# Patient Record
Sex: FEMALE | Race: WHITE
[De-identification: names, ages, dates, MRNs, and addresses within clinical notes are randomized per-mention and may not be internally consistent; named-entity substitution may affect disease eponyms.]

---

## 2018-11-05 ENCOUNTER — HOSPITAL ENCOUNTER (EMERGENCY)
Dept: HOSPITAL 25 - ED | Age: 28
Discharge: HOME | End: 2018-11-05
Payer: SELF-PAY

## 2018-11-05 VITALS — DIASTOLIC BLOOD PRESSURE: 78 MMHG | SYSTOLIC BLOOD PRESSURE: 128 MMHG

## 2018-11-05 DIAGNOSIS — R07.89: Primary | ICD-10-CM

## 2018-11-05 DIAGNOSIS — F41.9: ICD-10-CM

## 2018-11-05 LAB
BASOPHILS # BLD AUTO: 0 10^3/UL (ref 0–0.2)
EOSINOPHIL # BLD AUTO: 0.1 10^3/UL (ref 0–0.6)
HCT VFR BLD AUTO: 42 % (ref 35–47)
HGB BLD-MCNC: 14.3 G/DL (ref 12–16)
LYMPHOCYTES # BLD AUTO: 2.5 10^3/UL (ref 1–4.8)
MCH RBC QN AUTO: 29 PG (ref 27–31)
MCHC RBC AUTO-ENTMCNC: 34 G/DL (ref 31–36)
MCV RBC AUTO: 85 FL (ref 80–97)
MONOCYTES # BLD AUTO: 0.8 10^3/UL (ref 0–0.8)
NEUTROPHILS # BLD AUTO: 3.1 10^3/UL (ref 1.5–7.7)
NRBC # BLD AUTO: 0 10^3/UL
NRBC BLD QL AUTO: 0.1
PLATELET # BLD AUTO: 307 10^3/UL (ref 150–450)
RBC # BLD AUTO: 4.91 10^6/UL (ref 4–5.4)
WBC # BLD AUTO: 6.4 10^3/UL (ref 3.5–10.8)

## 2018-11-05 PROCEDURE — 83735 ASSAY OF MAGNESIUM: CPT

## 2018-11-05 PROCEDURE — 84484 ASSAY OF TROPONIN QUANT: CPT

## 2018-11-05 PROCEDURE — 82550 ASSAY OF CK (CPK): CPT

## 2018-11-05 PROCEDURE — 85025 COMPLETE CBC W/AUTO DIFF WBC: CPT

## 2018-11-05 PROCEDURE — 80053 COMPREHEN METABOLIC PANEL: CPT

## 2018-11-05 PROCEDURE — 36415 COLL VENOUS BLD VENIPUNCTURE: CPT

## 2018-11-05 PROCEDURE — 93005 ELECTROCARDIOGRAM TRACING: CPT

## 2018-11-05 PROCEDURE — 99283 EMERGENCY DEPT VISIT LOW MDM: CPT

## 2018-11-05 NOTE — ED
HPI Chest Pain





- HPI Summary


HPI Summary: 


Patient is a 27 y/o F w/ c/o left upper chest pain and LUE pain for the past 

1.5 hours. SOB is denied. 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 

Patient is on birth control. PMHx of anxiety, patient states she used to be on 

buspar but stopped taking it a few months ago as she "did not need it". On 

triage, pain is rated 5/10, movement is noted to aggravate Sx, nothing is 

reported to alleviate Sx. Home medications and allergies are reviewed. 








- History of Current Complaint


Chief Complaint: EDChestPainROMI


Time Seen by Provider: 11/05/18 22:00


Hx Obtained From: Patient


Onset/Duration: Started Hours Ago - 1.5 hours, Still Present


Timing: Constant, Lasting Hours - 1.5 hours


Current Severity: Moderate - 5/10


Pain Intensity: 5


Pain Scale Used: 0-10 Numeric - 5/10


Chest Pain Location: Left Anterior


Chest Pain Radiates: No


Aggravating Factor(s): Movement


Alleviating Factor(s): Nothing


Associated Signs and Symptoms: Positive: Chest Pain, Other: - LUE pain.  

Negative: Shortness of Breath





- Allergy/Home Medications


Allergies/Adverse Reactions: 


 Allergies











Allergy/AdvReac Type Severity Reaction Status Date / Time


 


prednisone AdvReac  See Comment Verified 11/05/18 22:01














PMH/Surg Hx/FS Hx/Imm Hx


Sensory History: 


   Denies: Hx Legally Blind, Hx Deafness


Opthamlomology History: 


   Denies: Hx Legally Blind


EENT History: 


   Denies: Hx Deafness


Psychiatric History: Reports: Hx Anxiety


Infectious Disease History: No


Infectious Disease History: 


   Denies: Traveled Outside the [place] in Last 30 Days





- Family History


Known Family History: 


   Negative: Blood Disorder





- Social History


Alcohol Use: Occasionally


Substance Use Type: Reports: None


Smoking Status (MU): Never Smoked Tobacco





Review of Systems


Positive: Chest Pain


Negative: Shortness Of Breath


Positive: Other - LUE pain 


All Other Systems Reviewed And Are Negative: Yes





Physical Exam





- Summary


Physical Exam Summary: 


VITAL SIGNS: Reviewed.


GENERAL:  Patient is a well-developed and nourished female who is lying 

comfortable in the stretcher. Patient is not in any acute respiratory distress.


HEAD AND FACE: No signs of trauma. No ecchymosis, hematomas or skull 

depressions. No sinus tenderness.


EYES: PERRLA, EOMI x 2, No injected conjunctiva, no nystagmus.


EARS: Hearing grossly intact. Ear canals and tympanic membranes are within 

normal limits.


MOUTH: Oropharynx within normal limits.


NECK: Supple, trachea is midline, no adenopathy, no JVD, no carotid bruit, no c-

spine tenderness, neck with full ROM.


CHEST: Symmetric, no tenderness at palpation


LUNGS: Clear to auscultation bilaterally. No wheezing or crackles.


CVS: Regular rate and rhythm, S1 and S2 present, no murmurs or gallops 

appreciated.


ABDOMEN: Soft, non-tender. No signs of distention. No rebound no guarding, and 

no masses palpated. Bowel sounds are normal.


EXTREMITIES: FROM in all major joints, no edema, no cyanosis or clubbing.


NEURO: Alert and oriented x 3. No acute neurological deficits. Speech is normal 

and follows commands.


SKIN: Dry and warm








Triage Information Reviewed: Yes


Vital Signs On Initial Exam: 


 Initial Vitals











Temp Pulse Resp BP Pulse Ox


 


 99.8 F   105   18   152/97   98 


 


 11/05/18 21:42  11/05/18 21:42  11/05/18 21:42  11/05/18 21:42  11/05/18 21:42











Vital Signs Reviewed: Yes





Diagnostics





- Vital Signs


 Vital Signs











  Temp Pulse Resp BP Pulse Ox


 


 11/05/18 21:42  99.8 F  105  18  152/97  98














- Laboratory


Result Diagrams: 


 11/05/18 22:36





 11/05/18 22:36


Lab Statement: Any lab studies that have been ordered have been reviewed, and 

results considered in the medical decision making process.





- EKG


  ** 2219


Cardiac Rate: NL - rate of 94 BPM


EKG Rhythm: Sinus Rhythm


Summary of EKG Findings: Normal axis. Normal interval. No ischemic changes





Re-Evaluation





- Re-Evaluation


  ** First Eval


Re-Evaluation Time: 23:36


Comment: [id number]  Patient reports that she feels better after Xanax. Patient will 

be discharged to home and is instructed to follow up with PCP in 1-2 days. She 

is agreeable with this plan.





Chest Pain Course/Dx





- Course


Course Of Treatment: Patient is a 27 y/o F w/ c/o left upper chest pain and LUE 

pain for the past 1.5 hours. SOB is denied. 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 

sized mattress. Patient is on birth control. PMHx of anxiety, patient states 

she used to be on buspar but stopped taking it a few months ago as she "did not 

need it". Physical exam was unremarkable.  During ED course, patient received 

Xanax 0.5 mg PO ONCE. Labs showed trop 0, glucose 108, alk phos 30, magnesium 

2.2. EKG showed normal sinus rhythm with rate of 94 BPM, normal axis, normal 

intervals, no ischemic changes.  2336  Patient reports that she feels better 

after Xanax. Patient will be discharged to home and is instructed to follow up 

with PCP in 1-2 days. She is agreeable with this plan. Dx of atypical chest pain

, anxiety.





- Diagnoses


Provider Diagnoses: 


 Anxiety, Atypical chest pain








Discharge





- Sign-Out/Discharge


Documenting (check all that apply): Patient Departure - discharge 





- Discharge Plan


Condition: Stable


Disposition: HOME


Patient Education Materials:  Chest Pain (ED), Anxiety (ED)


Referrals: 


[facility] [Outside] - 2 Days


Additional Instructions: 


RETURN TO THE EMERGENCY DEPARTMENT FOR CHANGING OR WORSENING SYMPTOMS. FOLLOW 

UP WITH PRIMARY CARE PHYSICIAN IN 1-2 DAYS.





- Attestation Statements


Document Initiated by Scribe: Yes


Documenting Scribe: Arturo Riggins 


Provider For Whom To is Documenting (Include Credential): Hina Mendez MD


Scribe Attestation: 


Arturo LANDRY , scribed for Hina Mendez MD on 11/06/18 at 0000.

## 2018-11-19 ENCOUNTER — HOSPITAL ENCOUNTER (EMERGENCY)
Dept: HOSPITAL 25 - UCEAST | Age: 28
Discharge: HOME | End: 2018-11-19
Payer: SELF-PAY

## 2018-11-19 VITALS — DIASTOLIC BLOOD PRESSURE: 94 MMHG | SYSTOLIC BLOOD PRESSURE: 146 MMHG

## 2018-11-19 DIAGNOSIS — J06.9: Primary | ICD-10-CM

## 2018-11-19 DIAGNOSIS — Z88.8: ICD-10-CM

## 2018-11-19 PROCEDURE — 99212 OFFICE O/P EST SF 10 MIN: CPT

## 2018-11-19 PROCEDURE — G0463 HOSPITAL OUTPT CLINIC VISIT: HCPCS

## 2018-11-20 NOTE — UC
- Progress Note


Progress Note: 





NO IMAGING





Discharge





- Sign-Out/Discharge


Documenting (check all that apply): Post-Discharge Follow Up


All imaging exams completed and their final reports reviewed: No Studies





- Discharge Plan


Condition: Improved


Disposition: HOME


Prescriptions: 


Fluticasone NASAL SPRAY 50MCG* [Flonase NASAL SPRAY 50MCG*] 2 spray BOTH NARES 

DAILY 7 Days #1 btl


Guaifenesin/Pseudo 600/60(NF) [Mucinex D 600/60 (NF)] 1 tab PO BID #14 tab


Patient Education Materials:  Upper Respiratory Infection (ED)


Forms:  *Work Release


Referrals: 


[facility] [place] [Outside]


Catherine Gavin DO [Primary Care Provider] - 


Additional Instructions: 


Humidifier while sleeping.  Avoid smokers.  Return with fever, increased pain 

in the sinus area, worse, new symptoms or other concerns.  Tylenol, ibuprofen 

as needed for discomfort.  Call the physician referral line to set up an 

appointment with primary care physician.





- Billing Disposition and Condition


Condition: IMPROVED


Disposition: Home

## 2018-11-29 NOTE — UC
Throat Pain/Nasal Asa HPI





- HPI Summary


HPI Summary: 





Patient presents with 4 day history of cough, congestion, sore throat and left 

ear discomfort.  She denies any fevers, nausea vomiting or diarrhea.





- History of Current Complaint


Chief Complaint: UCEar


Stated Complaint: L EAR PAIN, HEADACHE


Time Seen by Provider: 11/19/18 11:11


Hx Obtained From: Patient


Hx Last Menstrual Period: 11/11/18


Pain Intensity: 5


Pain Scale Used: 0-10 Numeric





- Allergies/Home Medications


Allergies/Adverse Reactions: 


 Allergies











Allergy/AdvReac Type Severity Reaction Status Date / Time


 


prednisone AdvReac  See Comment Verified 11/19/18 11:20











Home Medications: 


 Home Medications





Norgestrel-Ethinyl Estradiol [Cryselle-28 Tablet] 1 tab PO DAILY 11/19/18 [

History Confirmed 11/19/18]











PMH/Surg Hx/FS Hx/Imm Hx


Previously Healthy: Yes





- Surgical History


Surgical History: None





- Family History


Known Family History: 


   Negative: Blood Disorder





- Social History


Alcohol Use: Occasionally


Substance Use Type: None


Smoking Status (MU): Never Smoked Tobacco





Review of Systems


All Other Systems Reviewed And Are Negative: Yes


Constitutional: Positive: Fever


Eyes: Positive: Negative


ENT: Positive: Sore Throat, Ear Ache, Nasal Discharge, Sinus Congestion


Respiratory: Positive: Negative


Cardiovascular: Positive: Negative


Gastrointestinal: Positive: Negative





Physical Exam


Appearance: Well-Appearing, No Pain Distress, Well-Nourished


Vital Signs: 


 Initial Vital Signs











Temp  98.8 F   11/19/18 11:16


 


Pulse  96   11/19/18 11:16


 


Resp  18   11/19/18 11:16


 


BP  146/94   11/19/18 11:16


 


Pulse Ox  100   11/19/18 11:16











Eyes: Positive: Conjunctiva Clear


ENT: Positive: Pharynx normal, Pharyngeal erythema, Nasal congestion, Nasal 

drainage, Other - Serous effusion left ear.  Negative: Sinus tenderness


Neck exam: Normal


Neck: Positive: No Lymphadenopathy


Respiratory: Positive: Lungs clear


Cardiovascular: Positive: RRR


Musculoskeletal: Positive: Strength Intact, ROM Intact


Skin Exam: Normal





Throat Pain/Nasal Course/Dx





- Course


Course Of Treatment: Minor URI symptoms with serous effusion in the sore left 

ear.  Treat symptomatically.





- Differential Dx/Diagnosis


Provider Diagnosis: 


 Upper respiratory infection








Discharge





- Sign-Out/Discharge


Documenting (check all that apply): Patient Departure


All imaging exams completed and their final reports reviewed: No Studies





- Discharge Plan


Condition: Improved


Disposition: HOME


Prescriptions: 


Fluticasone NASAL SPRAY 50MCG* [Flonase NASAL SPRAY 50MCG*] 2 spray BOTH NARES 

DAILY 7 Days #1 btl


Guaifenesin/Pseudo 600/60(NF) [Mucinex D 600/60 (NF)] 1 tab PO BID #14 tab


Patient Education Materials:  Upper Respiratory Infection (ED)


Forms:  *Work Release


Referrals: 


[facility] [place] [facility] REFERR[facility] [Outside]


Catherine Gavin DO [Primary Care Provider] - 


Additional Instructions: 


Humidifier while sleeping.  Avoid smokers.  Return with fever, increased pain 

in the sinus area, worse, new symptoms or other concerns.  Tylenol, ibuprofen 

as needed for discomfort.  Call the physician referral line to set up an 

appointment with primary care physician.





- Billing Disposition and Condition


Condition: IMPROVED


Disposition: Home





- Attestation Statements


Document Initiated by Scribe: No